# Patient Record
Sex: MALE | Race: WHITE
[De-identification: names, ages, dates, MRNs, and addresses within clinical notes are randomized per-mention and may not be internally consistent; named-entity substitution may affect disease eponyms.]

---

## 2018-01-05 ENCOUNTER — HOSPITAL ENCOUNTER (EMERGENCY)
Dept: HOSPITAL 80 - FED | Age: 23
Discharge: HOME | End: 2018-01-05
Payer: COMMERCIAL

## 2018-01-05 VITALS
OXYGEN SATURATION: 96 % | SYSTOLIC BLOOD PRESSURE: 98 MMHG | DIASTOLIC BLOOD PRESSURE: 49 MMHG | HEART RATE: 94 BPM | TEMPERATURE: 98.4 F | RESPIRATION RATE: 18 BRPM

## 2018-01-05 DIAGNOSIS — R11.2: Primary | ICD-10-CM

## 2018-01-05 DIAGNOSIS — R19.7: ICD-10-CM

## 2018-01-05 DIAGNOSIS — E86.9: ICD-10-CM

## 2018-01-05 LAB — PLATELET # BLD: 176 10^3/UL (ref 150–400)

## 2018-01-05 NOTE — EDPHY
H & P


Stated Complaint: N/V/D x12 hours, abd pain


HPI/ROS: 





HPI





CHIEF COMPLAINT:  Nausea vomiting diarrhea





HISTORY OF PRESENT ILLNESS:  This patient very pleasant 22-year-old male, he is 

otherwise healthy no significant medical history does not take any medications 

he presents emergency room nausea vomiting diarrhea times 12 hr.  Patient 

reports around 4:00 p.m. yesterday he developed some nausea and vomiting.  He 

initially had watery diarrhea that then developed into nausea vomiting.  No 

hematemesis.  He denies any fever.  He states he has had some crampy abdominal 

pain diffusely but this has gotten better.  He still complains of nausea states 

that he vomits once per hour usually on the hour.  Unable to keep p.o. down.  

Last meal was yesterday.  Does additionally reports some burning in his chest 

when he vomits.








Past Medical History:  No significant medical history





Past Surgical History:  Tonsillectomy/adenoidectomy





Social History:  Denies daily use drugs alcohol tobacco products.  [employer] student.





Family History:  Noncontributory








ROS   


REVIEW OF SYSTEMS:


A comprehensive 10 point review of systems is otherwise negative aside from 

elements mentioned in the history of present illness.








Exam   


Constitutional  appears nontoxic, however appears dehydrated triage nursing 

summary reviewed, vital signs reviewed, awake/alert.  Vital signs noted to be 

tachycardic and somewhat hypotensive at triage.


Eyes   normal conjunctivae and sclera, EOMI, PERRLA. 


HENT   normal inspection, atraumatic, dry mucus membranes, no epistaxis, neck 

supple/ no meningismus, no raccoon eyes. 


Respiratory   clear to auscultation bilaterally, normal breath sounds, no 

respiratory distress, no wheezing. 


Cardiovascular   rate normal, regular rhythm, no murmur, no edema, distal 

pulses normal. 


Gastrointestinal   soft, non-tender, no rebound, no guarding, normal bowel 

sounds, no distension, no pulsatile mass. 


Genitourinary   no CVA tenderness. 


Musculoskeletal  no midline vertebral tenderness, full range of motion, no calf 

swelling, no tenderness of extremities, no meningismus, good pulses, 

neurovascularly intact.


Skin   pink, warm, & dry, no rash, skin atraumatic. 


Neurologic   awake, alert and oriented x 3, AAOx3, moves all 4 extremities 

equally, motor intact, sensory intact, CN II-XII intact, normal cerebellar, 

normal vision, normal speech. 


Psychiatric   normal mood/affect. 


Heme/Lymph/Immune   no lymphadenopathy.





Differential Diagnosis:  Includes but is not limited to in a particular order 

acute dehydration, acute nausea vomiting and diarrhea illness, enteritis, viral 

syndrome, electrolyte disturbance, doubt bowel obstruction, doubt acute 

appendicitis





Medical Decision Making:  Plan for this patient IV establishment with IV fluid 

bolus 2 L normal saline, 4 mg IV Zofran for nausea, check basic blood work, 

including electrolytes, urinalysis, and CBC and re-evaluate.





Re-evaluation:








0518AM:  Patient feeling much better after IV fluids he said 3 L of normal 

saline here.  Zofran.  Blood work was reviewed is reassuring.  His abdomen is 

soft nontender. He is not vomiting.  He would like to go home.  Prescription 

prescription for Zofran.  He p.o. challenge well.


Return precautions given.


I do not feel that he needs any imaging was abdomen is abdomen is soft 

nontender is not on any ongoing vomiting. He does  not have high white count.


Most likely has some GI illness.  Return precautions discussed and he 

understands.





Source: Patient





- Personal History


Current Tetanus/Diphtheria Vaccine: Yes





- Medical/Surgical History


Hx Asthma: No


Hx Chronic Respiratory Disease: No


Hx Diabetes: No


Hx Cardiac Disease: No


Hx Renal Disease: No


Hx Cirrhosis: No


Hx Alcoholism: No


Hx HIV/AIDS: No


Hx Splenectomy or Spleen Trauma: No


Other PMH: thyroid problems,





- Social History


Smoking Status: Never smoked


Constitutional: 


 Initial Vital Signs











Temperature (C)  37.1 C   01/05/18 03:04


 


Heart Rate  115 H  01/05/18 03:04


 


Respiratory Rate  18   01/05/18 03:04


 


Blood Pressure  95/65 L  01/05/18 03:04


 


O2 Sat (%)  95   01/05/18 03:04








 











O2 Delivery Mode               Nasal Cannula


 


O2 (L/minute)                  2














Allergies/Adverse Reactions: 


 





No Known Allergies Allergy (Unverified 01/05/18 03:07)


 








Home Medications: 














 Medication  Instructions  Recorded


 


Ondansetron HCl [Zofran] 4 mg PO Q4-6PRN PRN #10 tablet 01/05/18














Medical Decision Making





- Data Points


Laboratory Results: 


 Laboratory Results





 01/05/18 03:23 





 01/05/18 03:23 





 











  01/05/18 01/05/18 01/05/18





  04:58 03:23 03:23


 


WBC      9.42 10^3/uL 10^3/uL





     (3.80-9.50) 


 


RBC      5.30 10^6/uL 10^6/uL





     (4.40-6.38) 


 


Hgb      16.8 g/dL g/dL





     (13.7-17.5) 


 


Hct      46.6 % %





     (40.0-51.0) 


 


MCV      87.9 fL fL





     (81.5-99.8) 


 


MCH      31.7 pg pg





     (27.9-34.1) 


 


MCHC      36.1 g/dL g/dL





     (32.4-36.7) 


 


RDW      11.5 % %





     (11.5-15.2) 


 


Plt Count      176 10^3/uL 10^3/uL





     (150-400) 


 


MPV      11.6 fL fL





     (8.7-11.7) 


 


Neut % (Auto)      93.7 % H %





     (39.3-74.2) 


 


Lymph % (Auto)      2.1 % L %





     (15.0-45.0) 


 


Mono % (Auto)      3.7 % L %





     (4.5-13.0) 


 


Eos % (Auto)      0.0 % L %





     (0.6-7.6) 


 


Baso % (Auto)      0.3 % %





     (0.3-1.7) 


 


Nucleat RBC Rel Count      0.0 % %





     (0.0-0.2) 


 


Absolute Neuts (auto)      8.82 10^3/uL H 10^3/uL





     (1.70-6.50) 


 


Absolute Lymphs (auto)      0.20 10^3/uL L 10^3/uL





     (1.00-3.00) 


 


Absolute Monos (auto)      0.35 10^3/uL 10^3/uL





     (0.30-0.80) 


 


Absolute Eos (auto)      0.00 10^3/uL L 10^3/uL





     (0.03-0.40) 


 


Absolute Basos (auto)      0.03 10^3/uL 10^3/uL





     (0.02-0.10) 


 


Absolute Nucleated RBC      0.00 10^3/uL 10^3/uL





     (0-0.01) 


 


Immature Gran %      0.2 % %





     (0.0-1.1) 


 


Immature Gran #      0.02 10^3/uL 10^3/uL





     (0.00-0.10) 


 


Sodium    145 mEq/L H mEq/L  





    (134-144)  


 


Potassium    4.5 mEq/L mEq/L  





    (3.5-5.2)  


 


Chloride    104 mEq/L mEq/L  





    ()  


 


Carbon Dioxide    24 mEq/l mEq/l  





    (22-31)  


 


Anion Gap    17 mEq/L H mEq/L  





    (8-16)  


 


BUN    22 mg/dL mg/dL  





    (7-23)  


 


Creatinine    1.2 mg/dL mg/dL  





    (0.7-1.3)  


 


Estimated GFR    > 60   





    


 


Glucose    115 mg/dL H mg/dL  





    ()  


 


Calcium    10.0 mg/dL mg/dL  





    (8.5-10.4)  


 


Total Bilirubin    1.3 mg/dL mg/dL  





    (0.1-1.4)  


 


Conjugated Bilirubin    0.4 mg/dL mg/dL  





    (0.0-0.5)  


 


Unconjugated Bilirubin    0.9 mg/dL mg/dL  





    (0.0-1.1)  


 


AST    44 IU/L IU/L  





    (17-59)  


 


ALT    51 IU/L IU/L  





    (21-72)  


 


Alkaline Phosphatase    83 IU/L IU/L  





    ()  


 


Total Protein    8.1 g/dL g/dL  





    (6.3-8.2)  


 


Albumin    4.9 g/dL g/dL  





    (3.5-5.0)  


 


Lipase    47 IU/L IU/L  





    ()  


 


Urine Color  YELLOW     





    


 


Urine Appearance  MODERATELY TURBID     





    


 


Urine pH  5.0     





   (5.0-7.5)   


 


Ur Specific Gravity  1.029     





   (1.002-1.030)   


 


Urine Protein  NEGATIVE     





   (NEGATIVE)   


 


Urine Ketones  1+  H     





   (NEGATIVE)   


 


Urine Blood  NEGATIVE     





   (NEGATIVE)   


 


Urine Nitrate  NEGATIVE     





   (NEGATIVE)   


 


Urine Bilirubin  NEGATIVE     





   (NEGATIVE)   


 


Urine Urobilinogen  NEGATIVE EU EU    





   (0.2-1.0)   


 


Ur Leukocyte Esterase  NEGATIVE     





   (NEGATIVE)   


 


Urine Glucose  NEGATIVE     





   (NEGATIVE)   











Medications Given: 


 








Discontinued Medications





Hydromorphone HCl (Dilaudid)  0.5 mg IVP EDNOW ONE


   Stop: 01/05/18 03:09


   Last Admin: 01/05/18 03:39 Dose:  0.5 mg


Sodium Chloride (Ns)  1,000 mls @ 0 mls/hr IV EDNOW ONE; Wide Open


   PRN Reason: Protocol


   Stop: 01/05/18 03:09


   Last Admin: 01/05/18 03:39 Dose:  1,000 mls


Sodium Chloride (Ns)  1,000 mls @ 0 mls/hr IV EDNOW ONE; Wide Open


   PRN Reason: Protocol


   Stop: 01/05/18 03:09


   Last Admin: 01/05/18 03:39 Dose:  1,000 mls


Sodium Chloride (Ns)  1,000 mls @ 0 mls/hr IV ONCE ONE


   PRN Reason: Wide Open


   Stop: 01/05/18 04:35


   Last Admin: 01/05/18 04:37 Dose:  1,000 mls


Ondansetron HCl (Zofran)  4 mg IVP EDNOW ONE


   Stop: 01/05/18 03:09


   Last Admin: 01/05/18 03:39 Dose:  4 mg








Departure





- Departure


Disposition: Home, Routine, Self-Care


Clinical Impression: 


 Nausea vomiting and diarrhea





Condition: Good


Instructions:  Acute Nausea and Vomiting (ED)


Additional Instructions: 


1.  Reynolds diet for next 24-48 hours.


2.  Return emergency room if you have worsening abdominal pain fever vomiting.


3.  Zofran for nausea.


Referrals: 


Berny Deng DO [Primary Care Provider] - As per Instructions


Prescriptions: 


Ondansetron HCl [Zofran] 4 mg PO Q4-6PRN PRN #10 tablet


 PRN Reason: Nausea/Vomiting, Use 1st

## 2019-02-18 ENCOUNTER — HOSPITAL ENCOUNTER (EMERGENCY)
Dept: HOSPITAL 80 - FED | Age: 24
Discharge: HOME | End: 2019-02-18
Payer: COMMERCIAL

## 2019-02-18 VITALS — DIASTOLIC BLOOD PRESSURE: 77 MMHG | SYSTOLIC BLOOD PRESSURE: 116 MMHG

## 2019-02-18 DIAGNOSIS — E86.9: ICD-10-CM

## 2019-02-18 DIAGNOSIS — M79.661: ICD-10-CM

## 2019-02-18 DIAGNOSIS — R00.2: Primary | ICD-10-CM

## 2019-02-18 LAB — PLATELET # BLD: 167 10^3/UL (ref 150–400)

## 2019-02-18 NOTE — EDPHY
H & P


Stated Complaint: rapid heart beat x30 min now resolved, dizziness


Time Seen by Provider: 02/18/19 03:46


HPI/ROS: 





HPI





CHIEF COMPLAINT:  Fast heart rate, right calf pain.





HISTORY OF PRESENT ILLNESS:  This is a 23-year-old male, [employer] 

student, otherwise healthy without any significant medical history, does not 

take any medications presents emergency room at 3:45 a.m. In the morning 

stating that he woke up with very fast heart rate.  He did not count he is 

unsure how fast it was going.  He states he felt lightheaded.  Denies chest 

pain denies shortness of breath, denies pleuritic pain.  He also reports that 

he has been having for 1 week some right calf pain.  He is concerned about a 

blood clot.


Denies any chest pain or shortness of breath, denies pleuritic pain.


He states he woke up from sleep he thinks his heart was racing.  This caused 

him great concerns he decided come the emergency room.





Upon arrival to the emergency room is placed in ER room 13, denies chest pain 

or shortness of breath.  His heart rate is 80, blood pressure 136/85, pulse ox 

96% on room air.





Past Medical History:  Denies medical history





Past Surgical History:  Denies surgical history





Social History:  Denies drugs alcohol tobacco.  [employer] student.





Family History:  Noncontributory








ROS   


REVIEW OF SYSTEMS:


10 Systems were reviewed and negative with the exception of the elements 

mentioned in the history of present illness.








Exam   


Constitutional  appears well nontoxic no acute distress triage nursing summary 

reviewed, vital signs reviewed, awake/alert.  Heart rate 80, blood pressure 136/

85, pulse ox 96% on room air.


Eyes   normal conjunctivae and sclera, EOMI, PERRLA. 


HENT   normal inspection, atraumatic, moist mucus membranes, no epistaxis, neck 

supple/ no meningismus, no raccoon eyes. 


Respiratory   clear to auscultation bilaterally, normal breath sounds, no 

respiratory distress, no wheezing. 


Cardiovascular   rate normal, regular rhythm, no murmur, no edema, distal 

pulses normal. 


Gastrointestinal   soft, non-tender, no rebound, no guarding, normal bowel 

sounds, no distension, no pulsatile mass. 


Genitourinary   no CVA tenderness. 


Musculoskeletal right lower extremity:  No evidence swelling, no significant 

tenderness to the calf, good distal pulse, good cap refill, warm extremity, no 

compartment syndrome, no midline vertebral tenderness, full range of motion, no 

calf swelling, no tenderness of extremities, no meningismus, good pulses, 

neurovascularly intact.


Skin   pink, warm, & dry, no rash, skin atraumatic. 


Neurologic   awake, alert and oriented x 3, AAOx3, moves all 4 extremities 

equally, motor intact, sensory intact, CN II-XII intact, normal cerebellar, 

normal vision, normal speech. 


Psychiatric   normal mood/affect. 


Heme/Lymph/Immune   no lymphadenopathy.





Differential Diagnosis:  Includes but is not limited to in a particular order:  

Anxiety, panic attack, cardiac arrhythmia, electrolyte disturbance, PE, 

pneumonia, pneumothorax, DVT





Medical Decision Making:  Plan for this patient IV establishment with IV fluid 

bolus, chest x-ray, EKG, troponin, D-dimer, ultrasound right lower extremity, 

and re-evaluate.





Re-evaluation:








Here in the emergency room 3:45 a.m. The patient is resting comfortably no 

acute distress has stable vital signs.





EKG interpretation by me on record in [employer] system.  Impression time of 

EKG 3:46 a.m., sinus rhythm rate of 70 there is no signs of acute ischemia no 

signs of cardiac arrhythmia, no signs of WPW or Brugada.  Unremarkable EKG.





D-dimer negative.





Troponin negative.





EKG nonischemic.





Ultrasound the right lower extremity pending.





ED ultrasound of the right lower extremity Doppler faxed to me by direct 

radiology 4:34 a.m. No evidence of DVT in the right lower extremity.








ED x-ray chest one view reviewed by myself.  Negative for acute cardiopulmonary 

disease.





Patient re-evaluated 6:07 a.m. Resting comfortably.  Has no complaints denies 

chest pain or shortness of breath, denies palpitations.  Ambulated well 

throughout the emergency without any difficulty in feels fine.





We discussed test results including a negative D-dimer, negative troponin, and 

EKG that shows no acute ischemia or cardiac arrhythmia.


An ultrasound reveals no DVT.


A chest x-ray is unremarkable.





I do encourage him to rest, stay well-hydrated, drink lots of fluids.


Also distally recommend following up with Cardiology for fast heart rate 

palpitations





Also I discussed return precautions with him he understands return emergency 

room if develops chest pain, shortness of breath, fast heart rate, syncope, not 

doing well he understands this.


Source: Patient





- Personal History


Current Tetanus/Diphtheria Vaccine: Unsure





- Medical/Surgical History


Hx Asthma: No


Hx Chronic Respiratory Disease: No


Hx Diabetes: No


Hx Cardiac Disease: No


Hx Renal Disease: No


Hx Cirrhosis: No


Hx Alcoholism: No


Hx HIV/AIDS: No


Hx Splenectomy or Spleen Trauma: No


Other PMH: thyroid problems,





- Social History


Smoking Status: Never smoked


Constitutional: 


 Initial Vital Signs











Temperature (C)  36.4 C   02/18/19 03:36


 


Heart Rate  82   02/18/19 03:36


 


Respiratory Rate  16   02/18/19 03:36


 


Blood Pressure  120/71   02/18/19 03:36


 


O2 Sat (%)  97   02/18/19 03:36








 











O2 Delivery Mode               Room Air














Allergies/Adverse Reactions: 


 





No Known Allergies Allergy (Verified 02/18/19 03:38)


 








Home Medications: 














 Medication  Instructions  Recorded


 


NK [No Known Home Meds]  02/18/19














Medical Decision Making





- Data Points


Laboratory Results: 


 Laboratory Results





 02/18/19 03:50 





 02/18/19 03:50 





 











  02/18/19 02/18/19 02/18/19





  03:54 03:50 03:50


 


WBC      





    


 


RBC      





    


 


Hgb      





    


 


Hct      





    


 


MCV      





    


 


MCH      





    


 


MCHC      





    


 


RDW      





    


 


Plt Count      





    


 


MPV      





    


 


Neut % (Auto)      





    


 


Lymph % (Auto)      





    


 


Mono % (Auto)      





    


 


Eos % (Auto)      





    


 


Baso % (Auto)      





    


 


Nucleat RBC Rel Count      





    


 


Absolute Neuts (auto)      





    


 


Absolute Lymphs (auto)      





    


 


Absolute Monos (auto)      





    


 


Absolute Eos (auto)      





    


 


Absolute Basos (auto)      





    


 


Absolute Nucleated RBC      





    


 


Immature Gran %      





    


 


Immature Gran #      





    


 


D-Dimer      0.27 ug/mLFEU ug/mLFEU





     (0.00-0.50) 


 


Sodium    140 mEq/L mEq/L  





    (135-145)  


 


Potassium    3.6 mEq/L mEq/L  





    (3.5-5.2)  


 


Chloride    107 mEq/L mEq/L  





    ()  


 


Carbon Dioxide    27 mEq/l mEq/l  





    (22-31)  


 


Anion Gap    6 mEq/L mEq/L  





    (6-14)  


 


BUN    16 mg/dL mg/dL  





    (7-23)  


 


Creatinine    1.2 mg/dL mg/dL  





    (0.7-1.3)  


 


Estimated GFR    > 60   





    


 


Glucose    85 mg/dL mg/dL  





    ()  


 


Calcium    8.7 mg/dL mg/dL  





    (8.5-10.4)  


 


Magnesium    2.2 mg/dL mg/dL  





    (1.6-2.3)  


 


Total Bilirubin    0.9 mg/dL mg/dL  





    (0.1-1.4)  


 


Conjugated Bilirubin    0.3 mg/dL mg/dL  





    (0.0-0.5)  


 


Unconjugated Bilirubin    0.6 mg/dL mg/dL  





    (0.0-1.1)  


 


AST    36 IU/L IU/L  





    (17-59)  


 


ALT    31 IU/L IU/L  





    (21-72)  


 


Alkaline Phosphatase    79 IU/L IU/L  





    ()  


 


POC Troponin I  0.00 ng/mL ng/mL    





   (0.00-0.08)   


 


NT-Pro-B Natriuret Pep    27 pg/mL pg/mL  





    (0-125)  


 


Total Protein    6.9 g/dL g/dL  





    (6.3-8.2)  


 


Albumin    4.0 g/dL g/dL  





    (3.5-5.0)  














  02/18/19





  03:50


 


WBC  5.94 10^3/uL 10^3/uL





   (3.80-9.50) 


 


RBC  4.86 10^6/uL 10^6/uL





   (4.40-6.38) 


 


Hgb  15.1 g/dL g/dL





   (13.7-17.5) 


 


Hct  43.1 % %





   (40.0-51.0) 


 


MCV  88.7 fL fL





   (81.5-99.8) 


 


MCH  31.1 pg pg





   (27.9-34.1) 


 


MCHC  35.0 g/dL g/dL





   (32.4-36.7) 


 


RDW  11.8 % %





   (11.5-15.2) 


 


Plt Count  167 10^3/uL 10^3/uL





   (150-400) 


 


MPV  11.6 fL fL





   (8.7-11.7) 


 


Neut % (Auto)  36.5 % L %





   (39.3-74.2) 


 


Lymph % (Auto)  48.5 % H %





   (15.0-45.0) 


 


Mono % (Auto)  10.4 % %





   (4.5-13.0) 


 


Eos % (Auto)  3.7 % %





   (0.6-7.6) 


 


Baso % (Auto)  0.7 % %





   (0.3-1.7) 


 


Nucleat RBC Rel Count  0.0 % %





   (0.0-0.2) 


 


Absolute Neuts (auto)  2.17 10^3/uL 10^3/uL





   (1.70-6.50) 


 


Absolute Lymphs (auto)  2.88 10^3/uL 10^3/uL





   (1.00-3.00) 


 


Absolute Monos (auto)  0.62 10^3/uL 10^3/uL





   (0.30-0.80) 


 


Absolute Eos (auto)  0.22 10^3/uL 10^3/uL





   (0.03-0.40) 


 


Absolute Basos (auto)  0.04 10^3/uL 10^3/uL





   (0.02-0.10) 


 


Absolute Nucleated RBC  0.00 10^3/uL 10^3/uL





   (0-0.01) 


 


Immature Gran %  0.2 % %





   (0.0-1.1) 


 


Immature Gran #  0.01 10^3/uL 10^3/uL





   (0.00-0.10) 


 


D-Dimer  





  


 


Sodium  





  


 


Potassium  





  


 


Chloride  





  


 


Carbon Dioxide  





  


 


Anion Gap  





  


 


BUN  





  


 


Creatinine  





  


 


Estimated GFR  





  


 


Glucose  





  


 


Calcium  





  


 


Magnesium  





  


 


Total Bilirubin  





  


 


Conjugated Bilirubin  





  


 


Unconjugated Bilirubin  





  


 


AST  





  


 


ALT  





  


 


Alkaline Phosphatase  





  


 


POC Troponin I  





  


 


NT-Pro-B Natriuret Pep  





  


 


Total Protein  





  


 


Albumin  





  











Medications Given: 


 








Discontinued Medications





Sodium Chloride (Ns)  1,000 mls @ 0 mls/hr IV EDNOW ONE; Wide Open


   PRN Reason: Protocol


   Stop: 02/18/19 03:46


   Last Admin: 02/18/19 03:49 Dose:  1,000 mls





Point of Care Test Results: 


 Chemistry











  02/18/19





  03:54


 


POC Troponin I  0.00 ng/mL ng/mL





   (0.00-0.08) 














Departure





- Departure


Disposition: Home, Routine, Self-Care


Clinical Impression: 


 Palpitations





Condition: Good


Instructions:  Heart Palpitations (ED)


Additional Instructions: 


1. Rest and stay well-hydrated.


2. Return to the emergency room if develops worsening symptoms


3. Rest no significant strenuous activity.


4. Make sure to drink lots of fluids


5. Follow up with Cardiology about you're heart palpitations or fast heart rate.


Referrals: 


Benry Deng DO [Primary Care Provider] - As per Instructions


Alvaro Kilpatrick MD [Medical Doctor] - As per Instructions

## 2019-02-19 NOTE — CPEKG
Test Reason : OPEN

Blood Pressure : ***/*** mmHG

Vent. Rate : 070 BPM     Atrial Rate : 068 BPM

   P-R Int : 161 ms          QRS Dur : 093 ms

    QT Int : 396 ms       P-R-T Axes : 047 054 048 degrees

   QTc Int : 428 ms

 

Sinus rhythm

 

Confirmed by Randall Shetty ([id number]) on 2/19/2019 8:15:54 AM

 

Referred By: PHYSICIAN ED           Confirmed By:Randall Shetty